# Patient Record
Sex: FEMALE | Race: WHITE
[De-identification: names, ages, dates, MRNs, and addresses within clinical notes are randomized per-mention and may not be internally consistent; named-entity substitution may affect disease eponyms.]

---

## 2017-05-12 ENCOUNTER — HOSPITAL ENCOUNTER (OUTPATIENT)
Dept: HOSPITAL 45 - C.MAMM | Age: 62
Discharge: HOME | End: 2017-05-12
Attending: INTERNAL MEDICINE
Payer: COMMERCIAL

## 2017-05-12 DIAGNOSIS — M81.0: ICD-10-CM

## 2017-05-12 DIAGNOSIS — E21.3: Primary | ICD-10-CM

## 2017-07-27 ENCOUNTER — HOSPITAL ENCOUNTER (OUTPATIENT)
Dept: HOSPITAL 45 - C.MAMM | Age: 62
Discharge: HOME | End: 2017-07-27
Payer: COMMERCIAL

## 2017-07-27 DIAGNOSIS — Z12.31: Primary | ICD-10-CM

## 2017-07-27 DIAGNOSIS — Z85.3: ICD-10-CM

## 2017-07-27 DIAGNOSIS — R92.8: ICD-10-CM

## 2017-07-27 NOTE — MAMMOGRAPHY REPORT
BILATERAL DIGITAL SCREENING MAMMOGRAM TOMOSYNTHESIS WITH CAD: 7/27/2017

CLINICAL HISTORY: Asymptomatic. Personal history of breast cancer.  





TECHNIQUE:  Breast tomosynthesis in addition to standard 2D mammography was performed. Current study 
was also evaluated with a Computer Aided Detection (CAD) system.  



COMPARISON: Comparison is made to exams dated:  7/22/2016 mammogram, 1/28/2015 mammogram, 1/7/2015 ul
trasound, 11/19/2014 mammogram, 11/15/2013 mammogram, and 3/28/2012 mammogram - [facility].   



BREAST COMPOSITION:  The tissue of both breasts is extremely dense, which lowers the sensitivity of m
ammography.  



FINDINGS:  There is expected architectural distortion in the left breast from prior surgery.  However
, there is another possible area of architectural distortion in the superior posterior right breast, 
only seen on the MLO view for which additional spot compression tomosynthesis views and possibly ultr
asound are recommended.



There is a stable metallic biopsy marker in the lower inner quadrant of the right breast.  A stable b
enign rim calcification in the left breast. No other suspicious mass, architectural distortion or clu
ster of microcalcifications is seen.  



IMPRESSION:  ACR BI-RADS CATEGORY 0: INCOMPLETE EVALUATION:  NEED ADDITIONAL IMAGING EVALUATION

The possible architectural distortion in the superior posterior right breast needs additional evaluat
ion.  

The patient will be called to schedule an appointment.  





Approximately 10% of breast cancers are not detected with mammography. A negative mammographic report
 should not delay biopsy if a clinically suggestive mass is present.



Roselia Aguila M.D.          

ay/:7/27/2017 15:26:37  



Imaging Technologist: Erika CHE)(RASTA), [facility]

letter sent: Addl Imaging 0  

BI-RADS Code: ACR BI-RADS Category 0: Incomplete Evaluation:  Need Additional Imaging Evaluation

## 2017-08-10 ENCOUNTER — HOSPITAL ENCOUNTER (OUTPATIENT)
Dept: HOSPITAL 45 - C.MAMM | Age: 62
Discharge: HOME | End: 2017-08-10
Payer: COMMERCIAL

## 2017-08-10 DIAGNOSIS — R92.2: Primary | ICD-10-CM

## 2017-08-10 DIAGNOSIS — N64.89: ICD-10-CM

## 2017-08-11 NOTE — MAMMOGRAPHY REPORT
UNILATERAL RIGHT DIGITAL DIAGNOSTIC MAMMOGRAM TOMOSYNTHESIS: 8/10/2017

CLINICAL HISTORY: Callback from screening mammogram for possible right breast architectural distortio
n.  





TECHNIQUE:  Breast tomosynthesis in addition to standard 2D mammography was performed.  Spot compress
ion right CC and MLO 2-D and tomosynthesis images were obtained.  



COMPARISON: Comparison is made to exams dated:  7/27/2017 mammogram, 7/22/2016 mammogram, 7/22/2015 b
reast MRI, 1/28/2015 mammogram, 1/7/2015 ultrasound, and 11/19/2014 mammogram - [facility].   



BREAST COMPOSITION:  The tissue of the right breast is extremely dense, which lowers the sensitivity 
of mammography.  



FINDINGS:  The previously described possible architectural distortion seen within the right superior 
breast on the MLO view does not persist on the additional spot compression views.  The tissue in this
 region has the appearance of normal fibroglandular tissue on the additional images, without evidence
 of a mass or architectural distortion noted.  Findings are benign and compatible with normal fibrogl
andular tissue.



IMPRESSION:  ACR BI-RADS CATEGORY 2: BENIGN

The possible right breast architectural distortion does not persist on the additional views.  Finding
s are benign and compatible with normal fibroglandular tissue.  There is no mammographic evidence of 
malignancy. A 1 year screening mammogram is recommended.  The patient has been verbally notified of t
he results.  





Approximately 10% of breast cancers are not detected with mammography. A negative mammographic report
 should not delay biopsy if a clinically suggestive mass is present.



Zayra Blount M.D.          

ah/:8/10/2017 14:24:42  



Imaging Technologist: Tracey VERGARA(ROSA)(M), [facility]

letter sent: Normal 1/2  

BI-RADS Code: ACR BI-RADS Category 2: Benign

## 2018-02-22 ENCOUNTER — HOSPITAL ENCOUNTER (OUTPATIENT)
Dept: HOSPITAL 45 - C.LAB1850 | Age: 63
Discharge: HOME | End: 2018-02-22
Attending: INTERNAL MEDICINE
Payer: COMMERCIAL

## 2018-02-22 DIAGNOSIS — E21.3: Primary | ICD-10-CM

## 2018-02-22 DIAGNOSIS — E55.9: ICD-10-CM

## 2018-02-22 LAB
ALBUMIN SERPL-MCNC: 3.6 GM/DL (ref 3.4–5)
ALP SERPL-CCNC: 35 U/L (ref 45–117)
ALT SERPL-CCNC: 15 U/L (ref 12–78)
AST SERPL-CCNC: 19 U/L (ref 15–37)
BUN SERPL-MCNC: 17 MG/DL (ref 7–18)
CALCIUM SERPL-MCNC: 8.5 MG/DL (ref 8.5–10.1)
CO2 SERPL-SCNC: 27 MMOL/L (ref 21–32)
CREAT SERPL-MCNC: 0.98 MG/DL (ref 0.6–1.2)
GLUCOSE SERPL-MCNC: 92 MG/DL (ref 70–99)
POTASSIUM SERPL-SCNC: 4 MMOL/L (ref 3.5–5.1)
PROT SERPL-MCNC: 7 GM/DL (ref 6.4–8.2)
SODIUM SERPL-SCNC: 136 MMOL/L (ref 136–145)

## 2018-08-16 ENCOUNTER — HOSPITAL ENCOUNTER (OUTPATIENT)
Dept: HOSPITAL 45 - C.MAMM | Age: 63
Discharge: HOME | End: 2018-08-16
Attending: INTERNAL MEDICINE
Payer: COMMERCIAL

## 2018-08-16 DIAGNOSIS — Z12.31: Primary | ICD-10-CM

## 2018-08-16 DIAGNOSIS — Z85.3: ICD-10-CM

## 2018-08-16 NOTE — MAMMOGRAPHY REPORT
BILATERAL DIGITAL SCREENING MAMMOGRAM TOMOSYNTHESIS WITH CAD: 8/16/2018

CLINICAL HISTORY: Asymptomatic. Personal history of breast cancer.  





TECHNIQUE: The study was acquired using full field digital technology and interpreted from soft copy.
 Breast tomosynthesis in addition to standard 2D mammography was performed. Current study was also ev
aluated with a Computer Aided Detection (CAD) system.  



COMPARISON: Comparison is made to exams dated:  8/10/2017 mammogram, 7/27/2017 mammogram, 7/22/2016 m
ammogram, 1/28/2015 mammogram, 11/19/2014 mammogram, and 3/10/2010 mammogram - [facility].   

BREAST COMPOSITION: The tissue of both breasts is extremely dense, which lowers the sensitivity of ma
mmography.  



FINDINGS: 

No suspicious masses, calcifications, or areas of architectural distortion are noted in either breast
. There has been no significant interval change compared to prior exams.   There are stable postsurgi
govind changes in the left breast from prior lumpectomy.  A biopsy marker clip is again noted in the rig
ht lower inner quadrant.





IMPRESSION: ACR BI-RADS CATEGORY 2: BENIGN

There is no mammographic evidence of malignancy. A 1 year screening mammogram is recommended.(08/17/2
019)  The patient will receive written notification of the results.  





Some breast cancers are not detected with mammography. A negative mammographic report should not rufino
y biopsy if a clinically suggestive mass is present.



Zayra Blount M.D.          

/:8/16/2018 14:37:50  



Imaging Technologist: RT González(ROSA)(M), [facility]

letter sent: Normal 1/2  

BI-RADS Code: ACR BI-RADS Category 2: Benign